# Patient Record
(demographics unavailable — no encounter records)

---

## 2025-01-10 NOTE — HISTORY OF PRESENT ILLNESS
[FreeTextEntry1] : referred by PCP for Urinary issues. he noted increased frequency urgency and occasional leakage with nocturia 4-5 times. The FOS slower but no straining or intermittency Took antibiotics for ? prostatitis followed by tamsulosin. Voiding improved but doesn't like the ejaculatory side effect. UA was negative, PSA 2.2 and ULS noted a 78cc prostate. He also notes some ED in terms of reduced rigidity and can losie too quickly. Looking for some help. notes worsened when started a beta blocker   11/23 - put him on daily Tadalafil last visit. notes less frequency urgency and nocturia 2 from 4-5. FOS also stronger erections also better.  1/25 on daily tadalafil had recent increased frequency - every 1 hour - saw PCP: negative urine and PSA' took tamsulosin instead for 3 days then back to the tadalafil - voiding as before. some frequency, urgency if has coffee with nocturia 2 times. FOS devcent - n dysuria or hematuria.